# Patient Record
Sex: FEMALE | Race: AMERICAN INDIAN OR ALASKA NATIVE
[De-identification: names, ages, dates, MRNs, and addresses within clinical notes are randomized per-mention and may not be internally consistent; named-entity substitution may affect disease eponyms.]

---

## 2018-04-01 ENCOUNTER — HOSPITAL ENCOUNTER (EMERGENCY)
Dept: HOSPITAL 5 - ED | Age: 63
Discharge: HOME | End: 2018-04-01
Payer: SELF-PAY

## 2018-04-01 VITALS — SYSTOLIC BLOOD PRESSURE: 147 MMHG | DIASTOLIC BLOOD PRESSURE: 91 MMHG

## 2018-04-01 DIAGNOSIS — M62.838: Primary | ICD-10-CM

## 2018-04-01 DIAGNOSIS — F17.200: ICD-10-CM

## 2018-04-01 DIAGNOSIS — M54.2: ICD-10-CM

## 2018-04-01 PROCEDURE — 96372 THER/PROPH/DIAG INJ SC/IM: CPT

## 2018-04-01 PROCEDURE — 99282 EMERGENCY DEPT VISIT SF MDM: CPT

## 2018-04-01 NOTE — EMERGENCY DEPARTMENT REPORT
ED Neck Pain/Injury HPI





- General


Chief Complaint: Neck Pain/Injury


Stated Complaint: NECK/HEAD PAIN


Time Seen by Provider: 04/01/18 15:08


Mode of arrival: Ambulatory


Limitations: No Limitations





- History of Present Illness


Initial Comments: 





This is a 63-year-old female nontoxic, well nourished in appearance, no acute 

signs of distress presents to the ED with c/o of neck pain 1 day.  Patient 

this morning she woke up with right paraspinal neck pain.  Patient denies any 

trauma.  Patient denies any headache, visual changes, fever, chills, nausea, 

vomiting, numbness, tingling, chest pain, shortness of breath, radiation of 

pain.  Patient denies any allergies or significant past medical history.  

Patient stated that her friend Darron is the  and will drive the 

patient home after discharge.


MD Complaint: neck pain


-: This morning


Place: home


Severity: mild


Severity scale (0 -10): 8


Quality: aching


Consistency: constant


Improves With: immobilization


Worsens With: movement of neck


Associated Symptoms: none.  denies: headache, fever, numbness, tingling, 

weakness, vertigo, difficulty walking, swollen glands, difficulty swallowing, 

nausea, vomiting





- Related Data


 Previous Rx's











 Medication  Instructions  Recorded  Last Taken  Type


 


Cyclobenzaprine [Flexeril] 10 mg PO BID PRN #10 tablet 04/01/18 Unknown Rx


 


Ibuprofen [Motrin] 600 mg PO Q8H PRN #30 tablet 04/01/18 Unknown Rx











 Allergies











Allergy/AdvReac Type Severity Reaction Status Date / Time


 


No Known Allergies Allergy   Unverified 04/01/18 14:50














ED Review of Systems


ROS: 


Stated complaint: NECK/HEAD PAIN


Other details as noted in HPI





Constitutional: denies: chills, fever


Eyes: denies: eye pain, eye discharge, vision change


ENT: denies: ear pain, throat pain


Respiratory: denies: cough, shortness of breath, wheezing


Cardiovascular: denies: chest pain, palpitations


Endocrine: no symptoms reported


Gastrointestinal: denies: abdominal pain, nausea, diarrhea


Genitourinary: denies: urgency, dysuria, discharge


Musculoskeletal: denies: back pain, joint swelling, arthralgia


Skin: denies: rash, lesions


Neurological: denies: headache, weakness, paresthesias


Psychiatric: denies: anxiety, depression


Hematological/Lymphatic: denies: easy bleeding, easy bruising





ED Past Medical Hx





- Past Medical History


Previous Medical History?: No





- Surgical History


Past Surgical History?: No





- Social History


Smoking Status: Current Every Day Smoker





- Medications


Home Medications: 


 Home Medications











 Medication  Instructions  Recorded  Confirmed  Last Taken  Type


 


Cyclobenzaprine [Flexeril] 10 mg PO BID PRN #10 tablet 04/01/18  Unknown Rx


 


Ibuprofen [Motrin] 600 mg PO Q8H PRN #30 tablet 04/01/18  Unknown Rx














ED Physical Exam





- General


Limitations: No Limitations


General appearance: alert, in no apparent distress





- Head


Head exam: Present: atraumatic, normocephalic





- Eye


Eye exam: Present: normal appearance


Pupils: Present: normal accommodation





- ENT


ENT exam: Present: normal exam, normal orophraynx, mucous membranes moist





- Neck


Neck exam: Present: normal inspection, full ROM.  Absent: tenderness, 

meningismus, lymphadenopathy





- Respiratory


Respiratory exam: Present: normal lung sounds bilaterally.  Absent: respiratory 

distress, wheezes, rales, rhonchi, stridor, chest wall tenderness, accessory 

muscle use, decreased breath sounds, prolonged expiratory





- Cardiovascular


Cardiovascular Exam: Present: regular rate, normal rhythm, normal heart sounds.

  Absent: bradycardia, tachycardia, irregular rhythm, systolic murmur, 

diastolic murmur, rubs, gallop





- GI/Abdominal


GI/Abdominal exam: Present: soft, normal bowel sounds





- Extremities Exam


Extremities exam: Present: normal inspection, full ROM, normal capillary refill





- Back Exam


Back exam: Present: normal inspection, full ROM, paraspinal tenderness (right 

cervical region).  Absent: tenderness, CVA tenderness (R), CVA tenderness (L), 

muscle spasm, vertebral tenderness, rash noted





- Expanded Back Exam


  ** Expanded


Back exam: Absent: saddle anesthesia


Back exam: Negative Straight Leg Raising: Left, Right





- Neurological Exam


Neurological exam: Present: alert, oriented X3, CN II-XII intact, normal gait, 

reflexes normal





- Expanded Neurological Exam


  ** Expanded


Patient oriented to: Present: person, place, time





- Psychiatric


Psychiatric exam: Present: normal affect, normal mood





- Skin


Skin exam: Present: warm, dry, intact, normal color.  Absent: rash





ED Course





 Vital Signs











  04/01/18





  14:50


 


Temperature 98.5 F


 


Pulse Rate 68


 


Respiratory 18





Rate 


 


Blood Pressure 147/91


 


O2 Sat by Pulse 99





Oximetry 














- Reevaluation(s)


Reevaluation #1: 





04/01/18 15:42


Patient is speaking in full sentences with no signs of distress noted.





ED Medical Decision Making





- Medical Decision Making





This is a 63-year-old female that presents with cervical muscular strain.  

Patient is stable and was examined by me.  Upon examination there is no signs 

or symptoms of meningococcal.  No neck rigidity.  Normal range of motion of 

neck.  No spinal tenderness or trauma.  Patient received Flexeril and Toradol 

in the ED if symptoms are improving and has subsided.  Patient is discharged 

with Flexeril and Motrin.  Patient's friend Darron is a current  of the 

discharge due to drowsiness of Flexeril.  Patient was referred to Follow-up 

with a primary care doctor in 3-5 days or if symptoms worsen and continue 

return to emergency room as soon as possible.  At time of discharge, the 

patient does not seem toxic or ill in appearance.  No acute signs of distress 

noted.  Patient agrees to discharge treatment plan of care.  No further 

questions noted by the patient.


Critical care attestation.: 


If time is entered above; I have spent that time in minutes in the direct care 

of this critically ill patient, excluding procedure time.








ED Disposition


Clinical Impression: 


 Cervical paraspinal muscle spasm





Disposition: DC-01 TO HOME OR SELFCARE


Is pt being admited?: No


Does the pt Need Aspirin: No


Condition: Stable


Instructions:  Muscle Spasm (ED), Cyclobenzaprine (By mouth), Ibuprofen (By 

mouth)


Additional Instructions: 


Follow-up with a primary care doctor in 3-5 days or if symptoms worsen and 

continue return to emergency room as soon as possible. 


Take ibuprofen and Flexeril as prescribed.  Do not operate heavy machinery 

while taking Flexeril due to sedation


Prescriptions: 


Cyclobenzaprine [Flexeril] 10 mg PO BID PRN #10 tablet


 PRN Reason: Muscle Spasm


Ibuprofen [Motrin] 600 mg PO Q8H PRN #30 tablet


 PRN Reason: Pain


Referrals: 


PRIMARY CARE,MD [Primary Care Provider] - 3-5 Days


ELIZABETH VEGA MD [Staff Physician] - 3-5 Days


[facility] [Outside] - 3-5 Days


[facility] [Outside] - 3-5 Days


Forms:  Work/School Release Form(ED)